# Patient Record
Sex: FEMALE | Race: OTHER | NOT HISPANIC OR LATINO | URBAN - METROPOLITAN AREA
[De-identification: names, ages, dates, MRNs, and addresses within clinical notes are randomized per-mention and may not be internally consistent; named-entity substitution may affect disease eponyms.]

---

## 2023-06-05 ENCOUNTER — EMERGENCY (EMERGENCY)
Facility: HOSPITAL | Age: 33
LOS: 0 days | Discharge: HOME | End: 2023-06-05
Attending: EMERGENCY MEDICINE
Payer: COMMERCIAL

## 2023-06-05 VITALS
HEIGHT: 65 IN | SYSTOLIC BLOOD PRESSURE: 117 MMHG | RESPIRATION RATE: 18 BRPM | HEART RATE: 95 BPM | OXYGEN SATURATION: 100 % | TEMPERATURE: 99 F | WEIGHT: 153 LBS | DIASTOLIC BLOOD PRESSURE: 55 MMHG

## 2023-06-05 DIAGNOSIS — M79.89 OTHER SPECIFIED SOFT TISSUE DISORDERS: ICD-10-CM

## 2023-06-05 DIAGNOSIS — M25.562 PAIN IN LEFT KNEE: ICD-10-CM

## 2023-06-05 DIAGNOSIS — M06.9 RHEUMATOID ARTHRITIS, UNSPECIFIED: ICD-10-CM

## 2023-06-05 PROCEDURE — 96372 THER/PROPH/DIAG INJ SC/IM: CPT

## 2023-06-05 PROCEDURE — 99283 EMERGENCY DEPT VISIT LOW MDM: CPT

## 2023-06-05 PROCEDURE — 99283 EMERGENCY DEPT VISIT LOW MDM: CPT | Mod: 25

## 2023-06-05 RX ORDER — KETOROLAC TROMETHAMINE 30 MG/ML
30 SYRINGE (ML) INJECTION ONCE
Refills: 0 | Status: DISCONTINUED | OUTPATIENT
Start: 2023-06-05 | End: 2023-06-05

## 2023-06-05 RX ADMIN — Medication 60 MILLIGRAM(S): at 14:33

## 2023-06-05 RX ADMIN — Medication 30 MILLIGRAM(S): at 14:32

## 2023-06-05 NOTE — ED ADULT NURSE NOTE - CHIEF COMPLAINT QUOTE
Patient was instructed to drink plenty of fluids today and tomorrow to flush contrast and protect her kidneys.   c/o left knee and hip pain x few days, patient states she received cortisone injection

## 2023-06-05 NOTE — ED PROVIDER NOTE - OBJECTIVE STATEMENT
32F PMH RA not currently on meds p/w 2 weeks of atraumatic Left knee pain and swelling. pulling sensation, constant nonradiating, worse w movement. feels like prior RA flares, states steroids usually help. ran out of steroids so came to ED. no trauma. no numbness, weakness. no redness, fever. no cp, sob. NSAIDS not helping. has a rheumatologist in [facility].

## 2023-06-05 NOTE — ED PROVIDER NOTE - CLINICAL SUMMARY MEDICAL DECISION MAKING FREE TEXT BOX
pt p/w L Knee pain x2 weeks, similar to prior RA flare, states steroids usually help. no trauma. no sign of septic arthritis. NV intact. 5/5 motor, FROM, normal gait. prednisone and toradol given in ED, dc home w pred rx and f/u with her rheumatologist in [facility] 1-2 weeks, strict return precautions

## 2023-06-05 NOTE — ED PROVIDER NOTE - PHYSICAL EXAMINATION
VITAL SIGNS: AFebrile, vital signs stable  CONSTITUTIONAL: Well-developed; well-nourished; in no acute distress.  SKIN: Skin exam is warm and dry, no acute rash.  HEAD: Normocephalic; atraumatic.  EYES: Pupils equal round reactive to light, Extraocular movements intact; conjunctiva and sclera clear.  ENT: No nasal discharge; airway clear. Moist mucus membranes.  NECK: Supple; non tender. No rigidity  EXT: Normal ROM. No clubbing, cyanosis. No calf tenderness to palpation. L knee minor effusion,  mild ttp, no eryrthema, no warmth, FROM active and passive, 5/5 motor, silt, 2+ pt pulse, normal gait, neg ant/post drawer, no laxity w varus/valgus pressure   NEURO: Alert and oriented x 3. No focal deficits.  PSYCH: Cooperative, appropriate.

## 2023-06-05 NOTE — ED PROVIDER NOTE - NSFOLLOWUPINSTRUCTIONS_ED_ALL_ED_FT

## 2023-06-05 NOTE — ED PROVIDER NOTE - PATIENT PORTAL LINK FT
You can access the FollowMyHealth Patient Portal offered by [facility] by registering at the following website: [email or web address]. By joining [facility]’s FollowMyHealth portal, you will also be able to view your health information using other applications (apps) compatible with our system.